# Patient Record
(demographics unavailable — no encounter records)

---

## 2025-01-27 NOTE — ASSESSMENT
[FreeTextEntry1] : Exam reveals significant nasal obstruction related nasal septal deviation, hypertrophic inferior nasal turbinates and allergic or nonspecific rhinitis. trial azelastine spray rec rhinology consult Surgery to correct deviated septum and or chronic sinusitis is an out patient procedure done entirely through the nose expect nasal packing and pain the first 2 or 3 days than congestion which slowly improves over 8 weeks. Complications may include bleeding and other less common problems which the surgeon will review with you.

## 2025-01-27 NOTE — PROCEDURE
[de-identified] : Indication for procedure:  Unable to adequately examine mid and posterior nasal cavity with anterior rhinoscopy The patient has chronic nasal obstruction  Sinus endoscope # 44 Nasal septum exam shows septal deviation to the left at valve and posterior 3 plus The inferior nasal turbinates are moderately hypertrophic in size bilaterally. The sinus endoscope was introduced into the right nares exam right middle meatus reveals no mucopus or inflammation.  The right middle turbinate is WNL. The scope was advanced and the sphenoethmoid region was inspected. The superior meatus, superior turbinate and nasal vault are unremarkable.  The nasopharynx is unremarkable without inflammation or mass. The sinus endoscope was introduced into the left nares and exam left middle meatus reveals no mucopus or inflammation.  The left middle turbinate is WNL. The scope was advanced and the sphenoethmoid region was inspected. The left superior meatus and nasal vault are unremarkable.

## 2025-01-27 NOTE — HISTORY OF PRESENT ILLNESS
[de-identified] : longstanding obstruction left side nose worse in summer no allergies neg hx sinusitis, no nasal trauma

## 2025-01-27 NOTE — REASON FOR VISIT
[Initial Consultation] : an initial consultation for [Tonsillitis] : tonsillitis [FreeTextEntry2] : left nostril breathing issues / throat

## 2025-01-27 NOTE — REVIEW OF SYSTEMS
[Patient Intake Form Reviewed] : Patient intake form was reviewed [Negative] : Mouth and Throat [Nasal Congestion] : no nasal congestion [Nose Bleeds] : no nose bleeds [Recurrent Sinus Infections] : no recurrent sinus infections [de-identified] : left nostril breathing problem/ left nostril narrow then right nostril [de-identified] : throat discomfort